# Patient Record
Sex: FEMALE | Race: WHITE | Employment: OTHER | URBAN - METROPOLITAN AREA
[De-identification: names, ages, dates, MRNs, and addresses within clinical notes are randomized per-mention and may not be internally consistent; named-entity substitution may affect disease eponyms.]

---

## 2017-07-18 ENCOUNTER — OFFICE VISIT (OUTPATIENT)
Dept: OBGYN | Facility: CLINIC | Age: 72
End: 2017-07-18
Payer: MEDICARE

## 2017-07-18 ENCOUNTER — RADIANT APPOINTMENT (OUTPATIENT)
Dept: MAMMOGRAPHY | Facility: CLINIC | Age: 72
End: 2017-07-18
Payer: MEDICARE

## 2017-07-18 VITALS — SYSTOLIC BLOOD PRESSURE: 124 MMHG | HEIGHT: 62 IN | DIASTOLIC BLOOD PRESSURE: 86 MMHG

## 2017-07-18 DIAGNOSIS — C54.1 ADENOCARCINOMA OF THE ENDOMETRIUM/UTERUS (H): ICD-10-CM

## 2017-07-18 DIAGNOSIS — Z12.31 VISIT FOR SCREENING MAMMOGRAM: ICD-10-CM

## 2017-07-18 DIAGNOSIS — Z01.419 ENCOUNTER FOR GYNECOLOGICAL EXAMINATION WITHOUT ABNORMAL FINDING: Primary | ICD-10-CM

## 2017-07-18 PROCEDURE — G0145 SCR C/V CYTO,THINLAYER,RESCR: HCPCS | Performed by: OBSTETRICS & GYNECOLOGY

## 2017-07-18 PROCEDURE — G0202 SCR MAMMO BI INCL CAD: HCPCS | Mod: TC

## 2017-07-18 PROCEDURE — 99397 PER PM REEVAL EST PAT 65+ YR: CPT | Performed by: OBSTETRICS & GYNECOLOGY

## 2017-07-18 PROCEDURE — 77063 BREAST TOMOSYNTHESIS BI: CPT | Mod: TC

## 2017-07-18 ASSESSMENT — ANXIETY QUESTIONNAIRES
6. BECOMING EASILY ANNOYED OR IRRITABLE: NOT AT ALL
3. WORRYING TOO MUCH ABOUT DIFFERENT THINGS: NOT AT ALL
5. BEING SO RESTLESS THAT IT IS HARD TO SIT STILL: NOT AT ALL
GAD7 TOTAL SCORE: 0
1. FEELING NERVOUS, ANXIOUS, OR ON EDGE: NOT AT ALL
7. FEELING AFRAID AS IF SOMETHING AWFUL MIGHT HAPPEN: NOT AT ALL
2. NOT BEING ABLE TO STOP OR CONTROL WORRYING: NOT AT ALL
IF YOU CHECKED OFF ANY PROBLEMS ON THIS QUESTIONNAIRE, HOW DIFFICULT HAVE THESE PROBLEMS MADE IT FOR YOU TO DO YOUR WORK, TAKE CARE OF THINGS AT HOME, OR GET ALONG WITH OTHER PEOPLE: NOT DIFFICULT AT ALL

## 2017-07-18 ASSESSMENT — PATIENT HEALTH QUESTIONNAIRE - PHQ9: 5. POOR APPETITE OR OVEREATING: NOT AT ALL

## 2017-07-18 NOTE — MR AVS SNAPSHOT
"              After Visit Summary   2017    Oriana Smith    MRN: [id number]           Patient Information     Date Of Birth          1945        Visit Information        Provider Department      2017 2:00 PM Shan Blood MD [facility]        Today's Diagnoses     Encounter for gynecological examination without abnormal finding    -  1    Adenocarcinoma of the endometrium/uterus (H)           Follow-ups after your visit        Who to contact     If you have questions or need follow up information about today's clinic visit or your schedule please contact [facility] directly at [phone].  Normal or non-critical lab and imaging results will be communicated to you by MyChart, letter or phone within 4 business days after the clinic has received the results. If you do not hear from us within 7 days, please contact the clinic through [employer] or phone. If you have a critical or abnormal lab result, we will notify you by phone as soon as possible.  Submit refill requests through [employer] or call your pharmacy and they will forward the refill request to us. Please allow 3 business days for your refill to be completed.          Additional Information About Your Visit        MyChart Information     [employer] lets you send messages to your doctor, view your test results, renew your prescriptions, schedule appointments and more. To sign up, go to [email or web address] . Click on \"Log in\" on the left side of the screen, which will take you to the Welcome page. Then click on \"Sign up Now\" on the right side of the page.     You will be asked to enter the access code listed below, as well as some personal information. Please follow the directions to create your username and password.     Your access code is: [email or web address]  Expires: 10/16/2017  2:57 PM     Your access code will  in 90 days. If you need help or a new code, please call your [facility] clinic or " "[phone].        Care EveryWhere ID     This is your Care EveryWhere ID. This could be used by other organizations to access your [facility] medical records  [id number]        Your Vitals Were     Height Breastfeeding?                5' 2.25\" (1.581 m) No           Blood Pressure from Last 3 Encounters:   07/18/17 124/86   06/14/16 122/76   08/18/15 110/70    Weight from Last 3 Encounters:   06/14/16 124 lb (56.2 kg)   08/18/15 124 lb (56.2 kg)   10/25/11 117 lb (53.1 kg)              We Performed the Following     Pap imaged thin layer screen reflex to HPV if ASCUS - recommended age 25 - 29 years        Primary Care Provider Office Phone # Fax #    [facility] [phone]       [facility] [address] [address]        Equal Access to Services     DAWNA OZUNA : Hadii aad ku hadasho Soomaali, waaxda luqadaha, qaybta kaalmada adeegyada, waxay idiin hayrickeyn gina gallegosn . So [facility] [phone].    ATENCIÓN: Si habla español, tiene a grande disposición servicios gratuitos de asistencia lingüística. Sylvia al [phone].    We comply with applicable federal civil rights laws and [place] laws. We do not discriminate on the basis of race, color, national origin, age, disability sex, sexual orientation or gender identity.            Thank you!     Thank you for choosing [facility]  for your care. Our goal is always to provide you with excellent care. Hearing back from our patients is one way we can continue to improve our services. Please take a few minutes to complete the written survey that you may receive in the mail after your visit with us. Thank you!             Your Updated Medication List - Protect others around you: Learn how to safely use, store and throw away your medicines at www.disposemymeds.org.          This list is accurate as of: 7/18/17  2:57 PM.  Always use your most recent med list.                   Brand Name Dispense Instructions " for use Diagnosis    buPROPion 100 MG 12 hr tablet    WELLBUTRIN SR          escitalopram 20 MG tablet    LEXAPRO     10 mg        irbesartan 150 MG tablet    AVAPRO          LIPITOR 10 MG tablet   Generic drug:  atorvastatin      Take 5 mg by mouth daily.        methylphenidate ER 36 MG CR tablet    CONCERTA     Take 36-72 mg by mouth every morning.        VITAMIN D (CHOLECALCIFEROL) PO      Take by mouth daily

## 2017-07-18 NOTE — LETTER
August 1, 2017      Oriana Smith  [address] [facility] [address]    Dear ,      I am happy to inform you that your recent cervical cancer screening test (PAP smear) was normal.      Preventative screenings such as this help to ensure your health for years to come. You should repeat a pap smear in 1 year, unless otherwise directed.      You will still need to return to the clinic every year for your annual exam and other preventive tests.     Please contact the clinic at [phone] if you have further questions.       Sincerely,      Shan Blood MD/kelvin

## 2017-07-18 NOTE — PROGRESS NOTES
Oriana is a 72 year old No obstetric history on file. female who presents for annual exam.     Besides routine health maintenance, she has no other health concerns today .    Do you have a Health Care Directive?: No: Advance care planning was reviewed with patient; patient declined at this time.    Fall risk:        HPI: The patient is a 72-year-old who is now 7 years out from diagnosis and treatment of adenocarcinoma the endometrium. She has no current complaints and has no respiratory GI or vaginal bleeding symptoms. She has no  symptoms.  The patient does not have a PCP        GYNECOLOGIC HISTORY:  No LMP recorded. Patient is postmenopausal..   reports that she has quit smoking. Her smoking use included Cigarettes. She has never used smokeless tobacco.    Patient is not sexually active.  STD testing offered?  Declined  Last PHQ-9 score on record=   PHQ-9 SCORE 7/18/2017   Total Score 1     Last GAD7 score on record=   DEENA-7 SCORE 6/14/2016 7/18/2017   Total Score 1 0     Alcohol Score = 5    HEALTH MAINTENANCE:  Cholesterol: (No results found for: CHOL   Last Mammo: one year ago, Result: normal, Next Mammo: today   Pap: (  Lab Results   Component Value Date    PAP NIL 06/14/2016    PAP NIL 08/18/2015    )  DEXA: 6/14/16  Colonoscopy:  10/25/11, Result:  Redundant and long, Next Colonoscopy: NA years.    Health maintenance updated:  yes    HISTORY:  Obstetric History     No data available        Patient Active Problem List   Diagnosis   (none) - all problems resolved or deleted     Past Surgical History:   Procedure Laterality Date     COLONOSCOPY  10/25/2011    Procedure:COLONOSCOPY; COLONOSCOPY; Surgeon:MALLORIE DUFFY; Location: GI     ENT SURGERY      t/a     GYN SURGERY  jan 2010    tahbso nodes     laprotomy      endometriosis      Social History   Substance Use Topics     Smoking status: Former Smoker     Types: Cigarettes     Smokeless tobacco: Never Used     Alcohol use 0.0 oz/week     0 Standard  "drinks or equivalent per week      Comment: two glasses wine/night         Negative family history of: Hypertension, Coronary Artery Disease            Current Outpatient Prescriptions   Medication Sig     VITAMIN D, CHOLECALCIFEROL, PO Take by mouth daily     buPROPion (WELLBUTRIN SR) 100 MG 12 hr tablet      escitalopram (LEXAPRO) 20 MG tablet 10 mg      irbesartan (AVAPRO) 150 MG tablet      atorvastatin (LIPITOR) 10 MG tablet Take 5 mg by mouth daily.     methylphenidate (CONCERTA) 36 MG CR tablet Take 36-72 mg by mouth every morning.     No current facility-administered medications for this visit.        Allergies   Allergen Reactions     Darvon [Propoxyphene Hcl] Rash     Also had muscle contortions         Past medical, surgical, social and family history were reviewed and updated in EPIC.    ROS:   Genitourinary: Incontinence, Urgency and Vaginal Dryness  Neurologic: Dizziness  Psychiatric: Depression    EXAM:  /86  Ht 5' 2.25\" (1.581 m)  Breastfeeding? No   BMI: There is no height or weight on file to calculate BMI.    EXAM:  Constitutional: Appearance: Well nourished, well developed alert, in no acute distress  Neck:  Lymph Nodes:  No lymphadenopathy present    Thyroid:  Gland size normal, nontender, no nodules or masses present  on palpation  Chest:  Respiratory Effort:  Breathing unlabored  Cardiovascular:Heart    Auscultation:  Regular rate, normal rhythm, no murmurs present  Breasts: Inspection of Breasts:  No lymphadenopathy present.  Gastrointestinal:  Abdominal Examination:  Abdomen nontender to palpation, tone normal without     rigidity or guarding, no masses present, umbilicus without lesions    Liver and speen:  No hepatomegaly present, liver nontender to palpation    Hernias:  No hernias present  Lymphatic: Lymph Nodes:  No other lymphadenopathy present  Skin:  General Inspection:  No rashes present, no lesions present, no areas of  discoloration.    Genitalia and Groin:  No rashes " present, no lesions present, no areas of  discoloration, no masses present  Neurologic/Psychiatric:    Mental Status:  Oriented X3     Pelvic Exam:  External Genitalia:     Normal appearance for age, no discharge present, no tenderness present, no inflammatory lesions present, color normal  Vagina:     Normal vaginal vault without central or paravaginal defects, no discharge present, no inflammatory lesions present, no masses present  Bladder:     Nontender to palpation  Urethra:   Urethral Body:  Urethra palpation normal, urethra structural support normal   Urethral Meatus:  No erythema or lesions present  Cervix:     Surgically absent  Uterus:     Surgically absent  Adnexa:     Surgically absent  Perineum:     Perineum within normal limits, no evidence of trauma, no rashes or skin lesions present  Anus:     Anus within normal limits, no hemorrhoids present  Inguinal Lymph Nodes:     No lymphadenopathy present    COUNSELING:   Reviewed preventive health counseling, as reflected in patient instructions       Regular exercise       Healthy diet/nutrition    BMI:  There is no height or weight on file to calculate BMI. Pt declined to weigh today     reports that she has quit smoking. Her smoking use included Cigarettes. She has never used smokeless tobacco.      ASSESSMENT:  72 year old female with satisfactory annual exam.    ICD-10-CM    1. Encounter for gynecological examination without abnormal finding Z01.419        PLAN: Patient with history of adenocarcinoma the endometrium with no evidence of recurrence at this time. We will convey her Pap smear results and mammogram. She will continue on yearly screening with Pap for surveillance of endometrial cancer.      Shan Blood MD

## 2017-07-19 ASSESSMENT — ANXIETY QUESTIONNAIRES: GAD7 TOTAL SCORE: 0

## 2017-07-19 ASSESSMENT — PATIENT HEALTH QUESTIONNAIRE - PHQ9: SUM OF ALL RESPONSES TO PHQ QUESTIONS 1-9: 1

## 2017-07-20 LAB
COPATH REPORT: NORMAL
PAP: NORMAL

## 2019-09-03 NOTE — PROGRESS NOTES
Oriana is a 74 year old No obstetric history on file. female who presents for follow-up of adenocarcinoma of the endometrium.    Do you have a Health Care Directive?: Yes: Patient states has Advance Directive and will bring in a copy to clinic.    Fall risk:   Fallen 2 or more times in the past year?: No  Any fall with injury in the past year?: No    HPI : The patient is seen at this time for follow-up of adenocarcinoma of the endometrium diagnosed and treated surgically in 2010.  The patient has no new symptoms and is on a yearly screening basis for any recurrence of cancer at this time.  She has no other intercurrent health issues at this time.      GYNECOLOGIC HISTORY:  No LMP recorded. Patient is postmenopausal..   reports that she has quit smoking. Her smoking use included cigarettes. She has never used smokeless tobacco.    STD testing offered?  Declined  Last PHQ-9 score on record=   PHQ-9 SCORE 9/4/2019   PHQ-9 Total Score 1     Last GAD7 score on record=   DEENA-7 SCORE 6/14/2016 7/18/2017 9/4/2019   Total Score 1 0 2       HEALTH MAINTENANCE:  Cholesterol: 10/17/2016   Total= 221, Triglycerides=95, HDL=80, TOK=710  Last Mammo: 7/18/2017, Result: normal, Next Mammo: today   Pap: 7/18/2017 NIL  Lab Results   Component Value Date    PAP NIL 07/18/2017    PAP NIL 06/14/2016    PAP NIL 08/18/2015      DEXA:  6/14/2016  Colonoscopy:  10/25/2011, Result:  normal, Next Colonoscopy: 7 years.    HISTORY:  OB History   No data available     Past Medical History:   Diagnosis Date     Arthritis      Depression      High cholesterol      HTN (hypertension)      Malignant neoplasm (H) 2010    endometrial cancer     S/P OMA-BSO (total abdominal hysterectomy and bilateral salpingo-oophorectomy) 1/30/2010    CA of endometrium 1/30/10 OMA-BSO. Cervix removed.  5/13, 11/13, 2/14, 7/14, 2/15 NIL vaginal paps 8/15, 6/16 NIL/Neg HPV vaginal pap 7/17 NIL vaginal pap. Plan: Pt to continue with yearly paps per Dr. Blood.      Past  Surgical History:   Procedure Laterality Date     COLONOSCOPY  10/25/2011    Procedure:COLONOSCOPY; COLONOSCOPY; Surgeon:MALLORIE DUFFY; Location: GI     ENT SURGERY      t/a     GYN SURGERY  jan 2010    tahbso nodes     laprotomy      endometriosis     Family History   Problem Relation Age of Onset     Hypertension No family hx of      Coronary Artery Disease No family hx of      Social History     Socioeconomic History     Marital status:      Spouse name: Not on file     Number of children: Not on file     Years of education: Not on file     Highest education level: Not on file   Occupational History     Not on file   Social Needs     Financial resource strain: Not on file     Food insecurity:     Worry: Not on file     Inability: Not on file     Transportation needs:     Medical: Not on file     Non-medical: Not on file   Tobacco Use     Smoking status: Former Smoker     Types: Cigarettes     Smokeless tobacco: Never Used   Substance and Sexual Activity     Alcohol use: Yes     Alcohol/week: 0.0 oz     Comment: two glasses wine/night     Drug use: No     Sexual activity: Yes     Partners: Male     Birth control/protection: Post-menopausal   Lifestyle     Physical activity:     Days per week: Not on file     Minutes per session: Not on file     Stress: Not on file   Relationships     Social connections:     Talks on phone: Not on file     Gets together: Not on file     Attends Scientologist service: Not on file     Active member of club or organization: Not on file     Attends meetings of clubs or organizations: Not on file     Relationship status: Not on file     Intimate partner violence:     Fear of current or ex partner: Not on file     Emotionally abused: Not on file     Physically abused: Not on file     Forced sexual activity: Not on file   Other Topics Concern     Parent/sibling w/ CABG, MI or angioplasty before 65F 55M? Not Asked   Social History Narrative     Not on file     Current Outpatient  Medications   Medication Sig     atorvastatin (LIPITOR) 10 MG tablet Take 5 mg by mouth daily.     buPROPion (WELLBUTRIN SR) 100 MG 12 hr tablet      escitalopram (LEXAPRO) 20 MG tablet 10 mg      irbesartan (AVAPRO) 150 MG tablet      VITAMIN D, CHOLECALCIFEROL, PO Take by mouth daily     No current facility-administered medications for this visit.      Allergies   Allergen Reactions     Darvon [Propoxyphene Hcl] Rash     Also had muscle contortions         Past medical, surgical, social and family history were reviewed and updated in EPIC.    EXAM:  /70   Pulse 72   Breastfeeding? No    BMI: There is no height or weight on file to calculate BMI.    Constitutional: Appearance: Well nourished, well developed alert, in no acute distress  Breasts: Inspection of Breasts:  No lymphadenopathy present    Palpation of Breasts and Axillae:  No masses present on palpation, no  breast tenderness    Axillary Lymph Nodes:  No lymphadenopathy present  Neurologic/Psychiatric:    Mental Status:  Oriented X3     Pelvic Exam:  External Genitalia:     Normal appearance for age, no discharge present, no tenderness present, no inflammatory lesions present, color normal  Vagina:     Normal vaginal vault without central or paravaginal defects, no discharge present, no inflammatory lesions present, no masses present  Bladder:     Nontender to palpation  Urethra:   Urethral Body:  Urethra palpation normal, urethra structural support normal   Urethral Meatus:  No erythema or lesions present  Cervix:     Surgically absent  Uterus:     Surgically absent  Adnexa:     Surgically absent  Perineum:     Perineum within normal limits, no evidence of trauma, no rashes or skin lesions present  Anus:     Anus within normal limits, no hemorrhoids present  Inguinal Lymph Nodes:     No lymphadenopathy present    There is no height or weight on file to calculate BMI.     reports that she has quit smoking. Her smoking use included cigarettes. She  has never used smokeless tobacco.      ASSESSMENT:  74 year old female with satisfactory examination for follow-up of her cancer.  There is no evidence of recurrence.    ICD-10-CM    1. Encounter for gynecological examination without abnormal finding Z01.419 Pap imaged thin layer screen with HPV - recommended age 30 - 65     HPV High Risk Types DNA Cervical       COUNSELING:   Reviewed preventive health counseling, as reflected in patient instructions       Regular exercise       Healthy diet/nutrition    PLAN/PATIENT INSTRUCTIONS: We will convey the patient's Pap results and see her next year for her interval checkup.  She is asked to call for any abnormal discharge abdominal pain vaginal bleeding.  She still has ongoing urgency incontinence and has been treated with Myrbetriq in the past.        Shan Blood MD

## 2019-09-04 ENCOUNTER — ANCILLARY PROCEDURE (OUTPATIENT)
Dept: MAMMOGRAPHY | Facility: CLINIC | Age: 74
End: 2019-09-04
Payer: MEDICARE

## 2019-09-04 ENCOUNTER — OFFICE VISIT (OUTPATIENT)
Dept: OBGYN | Facility: CLINIC | Age: 74
End: 2019-09-04
Payer: MEDICARE

## 2019-09-04 VITALS — HEART RATE: 72 BPM | SYSTOLIC BLOOD PRESSURE: 110 MMHG | DIASTOLIC BLOOD PRESSURE: 70 MMHG

## 2019-09-04 DIAGNOSIS — C54.1 ADENOCARCINOMA OF THE ENDOMETRIUM/UTERUS (H): Primary | ICD-10-CM

## 2019-09-04 DIAGNOSIS — Z12.31 VISIT FOR SCREENING MAMMOGRAM: ICD-10-CM

## 2019-09-04 PROCEDURE — 77067 SCR MAMMO BI INCL CAD: CPT | Mod: TC

## 2019-09-04 PROCEDURE — G0476 HPV COMBO ASSAY CA SCREEN: HCPCS | Performed by: OBSTETRICS & GYNECOLOGY

## 2019-09-04 PROCEDURE — G0145 SCR C/V CYTO,THINLAYER,RESCR: HCPCS | Performed by: OBSTETRICS & GYNECOLOGY

## 2019-09-04 PROCEDURE — G0123 SCREEN CERV/VAG THIN LAYER: HCPCS | Performed by: OBSTETRICS & GYNECOLOGY

## 2019-09-04 PROCEDURE — 77063 BREAST TOMOSYNTHESIS BI: CPT | Mod: TC

## 2019-09-04 PROCEDURE — 87624 HPV HI-RISK TYP POOLED RSLT: CPT | Performed by: OBSTETRICS & GYNECOLOGY

## 2019-09-04 PROCEDURE — 99212 OFFICE O/P EST SF 10 MIN: CPT | Performed by: OBSTETRICS & GYNECOLOGY

## 2019-09-04 ASSESSMENT — ANXIETY QUESTIONNAIRES
2. NOT BEING ABLE TO STOP OR CONTROL WORRYING: NOT AT ALL
7. FEELING AFRAID AS IF SOMETHING AWFUL MIGHT HAPPEN: NOT AT ALL
GAD7 TOTAL SCORE: 2
IF YOU CHECKED OFF ANY PROBLEMS ON THIS QUESTIONNAIRE, HOW DIFFICULT HAVE THESE PROBLEMS MADE IT FOR YOU TO DO YOUR WORK, TAKE CARE OF THINGS AT HOME, OR GET ALONG WITH OTHER PEOPLE: NOT DIFFICULT AT ALL
3. WORRYING TOO MUCH ABOUT DIFFERENT THINGS: NOT AT ALL
1. FEELING NERVOUS, ANXIOUS, OR ON EDGE: SEVERAL DAYS
5. BEING SO RESTLESS THAT IT IS HARD TO SIT STILL: NOT AT ALL
6. BECOMING EASILY ANNOYED OR IRRITABLE: SEVERAL DAYS

## 2019-09-04 ASSESSMENT — PATIENT HEALTH QUESTIONNAIRE - PHQ9
SUM OF ALL RESPONSES TO PHQ QUESTIONS 1-9: 1
5. POOR APPETITE OR OVEREATING: NOT AT ALL

## 2019-09-04 NOTE — LETTER
September 12, 2019    Oriana CHAKA Luis  [address] [facility] [address]    Dear MsStephania,  This letter is regarding your recent Vaginal Pap smear (cervical cancer screening) and Human Papillomavirus (HPV) test.  We are happy to inform you that your Pap smear result is normal. Cervical cancer is closely linked with certain types of HPV. Your results showed no evidence of high-risk HPV.  We recommend you have your next PAP smear in 1 year.  You will still need to return to the clinic every year for an annual exam and other preventive tests.  If you have additional questions regarding this result, please call our registered nurse, Sallie at [phone].  Sincerely,    Shan Blood MD/kelvin

## 2019-09-04 NOTE — LETTER
September 12, 2019    Oriana CHAKA Luis  [address] [facility] [address]    Dear MsStephania,  This letter is regarding your recent Pap smear (cervical cancer screening) and Human Papillomavirus (HPV) test.  We are happy to inform you that your Pap smear result is normal. Cervical cancer is closely linked with certain types of HPV. Your results showed no evidence of high-risk HPV.  We recommend you have your next PAP smear in 1 year.  You will still need to return to the clinic every year for an annual exam and other preventive tests.  If you have additional questions regarding this result, please call our registered nurse, Sallie at [phone].  Sincerely,    Shan Blood MD/kelvin

## 2019-09-05 ASSESSMENT — ANXIETY QUESTIONNAIRES: GAD7 TOTAL SCORE: 2

## 2019-09-10 LAB
COPATH REPORT: NORMAL
FINAL DIAGNOSIS: NORMAL
HPV HR 12 DNA CVX QL NAA+PROBE: NEGATIVE
HPV16 DNA SPEC QL NAA+PROBE: NEGATIVE
HPV18 DNA SPEC QL NAA+PROBE: NEGATIVE
PAP: NORMAL
SPECIMEN DESCRIPTION: NORMAL
SPECIMEN SOURCE CVX/VAG CYTO: NORMAL

## 2020-10-13 ENCOUNTER — ANCILLARY PROCEDURE (OUTPATIENT)
Dept: MAMMOGRAPHY | Facility: CLINIC | Age: 75
End: 2020-10-13
Payer: MEDICARE

## 2020-10-13 ENCOUNTER — OFFICE VISIT (OUTPATIENT)
Dept: OBGYN | Facility: CLINIC | Age: 75
End: 2020-10-13
Payer: MEDICARE

## 2020-10-13 VITALS
HEART RATE: 84 BPM | HEIGHT: 62 IN | DIASTOLIC BLOOD PRESSURE: 70 MMHG | BODY MASS INDEX: 23.74 KG/M2 | WEIGHT: 129 LBS | SYSTOLIC BLOOD PRESSURE: 110 MMHG

## 2020-10-13 DIAGNOSIS — C54.1 ADENOCARCINOMA OF THE ENDOMETRIUM/UTERUS (H): Primary | ICD-10-CM

## 2020-10-13 DIAGNOSIS — Z12.31 VISIT FOR SCREENING MAMMOGRAM: ICD-10-CM

## 2020-10-13 PROCEDURE — 87624 HPV HI-RISK TYP POOLED RSLT: CPT | Performed by: OBSTETRICS & GYNECOLOGY

## 2020-10-13 PROCEDURE — 99207 PR NO CHARGE LOS: CPT | Performed by: OBSTETRICS & GYNECOLOGY

## 2020-10-13 PROCEDURE — G0145 SCR C/V CYTO,THINLAYER,RESCR: HCPCS | Performed by: OBSTETRICS & GYNECOLOGY

## 2020-10-13 PROCEDURE — 77067 SCR MAMMO BI INCL CAD: CPT | Performed by: RADIOLOGY

## 2020-10-13 PROCEDURE — 77063 BREAST TOMOSYNTHESIS BI: CPT | Performed by: RADIOLOGY

## 2020-10-13 RX ORDER — ASPIRIN 81 MG/1
81 TABLET, CHEWABLE ORAL DAILY
COMMUNITY

## 2020-10-13 RX ORDER — CALCIUM POLYCARBOPHIL 625 MG 625 MG/1
2 TABLET ORAL DAILY
COMMUNITY

## 2020-10-13 ASSESSMENT — PATIENT HEALTH QUESTIONNAIRE - PHQ9
SUM OF ALL RESPONSES TO PHQ QUESTIONS 1-9: 0
5. POOR APPETITE OR OVEREATING: NOT AT ALL

## 2020-10-13 ASSESSMENT — ANXIETY QUESTIONNAIRES
1. FEELING NERVOUS, ANXIOUS, OR ON EDGE: NOT AT ALL
GAD7 TOTAL SCORE: 0
2. NOT BEING ABLE TO STOP OR CONTROL WORRYING: NOT AT ALL
3. WORRYING TOO MUCH ABOUT DIFFERENT THINGS: NOT AT ALL
6. BECOMING EASILY ANNOYED OR IRRITABLE: NOT AT ALL
7. FEELING AFRAID AS IF SOMETHING AWFUL MIGHT HAPPEN: NOT AT ALL
IF YOU CHECKED OFF ANY PROBLEMS ON THIS QUESTIONNAIRE, HOW DIFFICULT HAVE THESE PROBLEMS MADE IT FOR YOU TO DO YOUR WORK, TAKE CARE OF THINGS AT HOME, OR GET ALONG WITH OTHER PEOPLE: NOT DIFFICULT AT ALL
5. BEING SO RESTLESS THAT IT IS HARD TO SIT STILL: NOT AT ALL

## 2020-10-13 ASSESSMENT — MIFFLIN-ST. JEOR: SCORE: 1030.45

## 2020-10-13 NOTE — LETTER
October 21, 2020      Oriana Smith  [address] [facility] [address]        Dear ,    We are happy to inform you that your recent vaginal Pap smear and Human Papillomavirus (HPV) test results are normal and negative.    It is recommended that you have your next Pap smear and Human Papillomavirus (HPV) test in 1 year. You will also need to return to the clinic every year for an annual wellness visit.    If you have additional questions regarding this result, please contact our office and we will be happy to assist you.      Sincerely,    Your [facility] Care Team

## 2020-10-13 NOTE — PROGRESS NOTES
Oriana is a 75 year old No obstetric history on file. female who presents for follow-up of adenocarcinoma of the endometrium FIGO stage I-2010    Do you have a Health Care Directive?: Yes, patient states has an Advance Care Planning document and will bring a copy to the clinic.    Fall risk:   Fallen 2 or more times in the past year?: Yes  Any fall with injury in the past year?: No    HPI : The patient is seen at this time for follow-up of adenocarcinoma of the endometrium.  She was treated surgically in 2010 and has been seen on a regular basis.  She shows no sign of recurrence.  She has had no sign of bleeding change in bowel or bladder function.  Of note she has had multiple Botox treatments to her bladder this year.  She was diagnosed with low pressure hydrocephalus and had a shunt placed this year.      GYNECOLOGIC HISTORY:  No LMP recorded. Patient is postmenopausal..   reports that she has quit smoking. Her smoking use included cigarettes. She has never used smokeless tobacco.    STD testing offered?  Declined  Last PHQ-9 score on record=   PHQ-9 SCORE 10/13/2020   PHQ-9 Total Score 0     Last GAD7 score on record=   DEENA-7 SCORE 7/18/2017 9/4/2019 10/13/2020   Total Score 0 2 0       HEALTH MAINTENANCE:  Cholesterol:08/07/2019   Total= 215, Triglycerides=115, HDL=70, NFY=419, FBS=98  Last Mammo: One year ago, Result: Normal, Next Mammo: Today   Pap:   Lab Results   Component Value Date    PAP NIL, HPV- 09/04/2019    PAP NIL 07/18/2017    PAP NIL 06/14/2016      DEXA: 06/14/2016   FINDINGS:               Lumbar Spine (L1-L4) T-score:  -0.8               Left Femoral Neck T-score:  -1.8               Right Femoral Neck T-score:  -2.3               Forearm (radius 33%) T-score:       Lumbar (L1-L4) BMD: 1.094   Previous: 1.114             Total Hip Mean BMD: 0.752    Previous: 0.826    Colonoscopy:  10/25/2011, Result:  Normal, Next Colonoscopy: Overdue.    HISTORY:  OB History   No obstetric history on file.      Past Medical History:   Diagnosis Date     Arthritis      Depression      High cholesterol      HTN (hypertension)      Malignant neoplasm (H) 2010    endometrial cancer     S/P OMA-BSO (total abdominal hysterectomy and bilateral salpingo-oophorectomy) 1/30/2010    CA of endometrium 1/30/10 OMA-BSO. Cervix removed.  5/13, 11/13, 2/14, 7/14, 2/15 NIL vaginal paps 8/15, 6/16 NIL/Neg HPV vaginal pap 7/17 NIL vaginal pap. Plan: Pt to continue with yearly paps per Dr. Blood.      Past Surgical History:   Procedure Laterality Date     COLONOSCOPY  10/25/2011    Procedure:COLONOSCOPY; COLONOSCOPY; Surgeon:MALLORIE DUFFY; Location: GI     ENT SURGERY      t/a     GYN SURGERY  01/01/2010    tahbso nodes     laprotomy      endometriosis     NEUROSURGERY ADULT IP CONSULT      Benign pressure hydrorcephelis     Family History   Problem Relation Age of Onset     Hypertension No family hx of      Coronary Artery Disease No family hx of      Social History     Socioeconomic History     Marital status:      Spouse name: Not on file     Number of children: Not on file     Years of education: Not on file     Highest education level: Not on file   Occupational History     Not on file   Social Needs     Financial resource strain: Not on file     Food insecurity     Worry: Not on file     Inability: Not on file     Transportation needs     Medical: Not on file     Non-medical: Not on file   Tobacco Use     Smoking status: Former Smoker     Types: Cigarettes     Smokeless tobacco: Never Used   Substance and Sexual Activity     Alcohol use: Yes     Alcohol/week: 0.0 standard drinks     Comment: two glasses wine/night     Drug use: No     Sexual activity: Yes     Partners: Male     Birth control/protection: Post-menopausal   Lifestyle     Physical activity     Days per week: Not on file     Minutes per session: Not on file     Stress: Not on file   Relationships     Social connections     Talks on phone: Not on file     Gets  "together: Not on file     Attends Mu-ism service: Not on file     Active member of club or organization: Not on file     Attends meetings of clubs or organizations: Not on file     Relationship status: Not on file     Intimate partner violence     Fear of current or ex partner: Not on file     Emotionally abused: Not on file     Physically abused: Not on file     Forced sexual activity: Not on file   Other Topics Concern     Parent/sibling w/ CABG, MI or angioplasty before 65F 55M? Not Asked   Social History Narrative     Not on file     Current Outpatient Medications   Medication Sig     aspirin (ASA) 81 MG chewable tablet Take 81 mg by mouth daily     atorvastatin (LIPITOR) 10 MG tablet Take 5 mg by mouth daily.     buPROPion (WELLBUTRIN SR) 100 MG 12 hr tablet      calcium polycarbophil (FIBERCON) 625 MG tablet Take 2 tablets by mouth daily     escitalopram (LEXAPRO) 20 MG tablet 10 mg      irbesartan (AVAPRO) 150 MG tablet      VITAMIN D, CHOLECALCIFEROL, PO Take by mouth daily     No current facility-administered medications for this visit.      Allergies   Allergen Reactions     Perfume Anaphylaxis     Floral scents from perfume, flowers     Darvon [Propoxyphene Hcl] Rash     Also had muscle contortions         Past medical, surgical, social and family history were reviewed and updated in EPIC.    EXAM:  /70   Pulse 84   Ht 1.562 m (5' 1.5\")   Wt 58.5 kg (129 lb)   Breastfeeding No   BMI 23.98 kg/m     BMI: Body mass index is 23.98 kg/m .    Constitutional: Appearance: Well nourished, well developed alert, in no acute distress  Breasts: Inspection of Breasts:  No lymphadenopathy present    Palpation of Breasts and Axillae:  No masses present on palpation, no  breast tenderness    Axillary Lymph Nodes:  No lymphadenopathy present  Neurologic/Psychiatric:    Mental Status:  Oriented X3     Pelvic Exam:  External Genitalia:     Normal appearance for age, no discharge present, no tenderness present, " no inflammatory lesions present, color normal  Vagina:     Normal vaginal vault without central or paravaginal defects, no discharge present, no inflammatory lesions present, no masses present  Bladder:     Nontender to palpation  Urethra:   Urethral Body:  Urethra palpation normal, urethra structural support normal   Urethral Meatus:  No erythema or lesions present  Cervix:     Surgically absent  Uterus:     Surgically absent  Adnexa:     Surgically absent  Perineum:     Perineum within normal limits, no evidence of trauma, no rashes or skin lesions present  Anus:     Anus within normal limits, no hemorrhoids present  Inguinal Lymph Nodes:     No lymphadenopathy present    Body mass index is 23.98 kg/m .     reports that she has quit smoking. Her smoking use included cigarettes. She has never used smokeless tobacco.      ASSESSMENT:  75 year old female with satisfactory follow-up of adenocarcinoma with no evidence of recurrence at this time..    ICD-10-CM    1. Encounter for gynecological examination without abnormal finding  Z01.419 Pap imaged thin layer screen with HPV - recommended age 30 - 65     HPV High Risk Types DNA Cervical       COUNSELING:   Reviewed preventive health counseling, as reflected in patient instructions       Regular exercise       Healthy diet/nutrition    PLAN/PATIENT INSTRUCTIONS: We will convey her Pap results and see her back next year.        Shan Blood MD

## 2020-10-14 ASSESSMENT — ANXIETY QUESTIONNAIRES: GAD7 TOTAL SCORE: 0

## 2020-10-15 LAB
COPATH REPORT: NORMAL
PAP: NORMAL

## 2020-10-19 LAB
FINAL DIAGNOSIS: NORMAL
HPV HR 12 DNA CVX QL NAA+PROBE: NEGATIVE
HPV16 DNA SPEC QL NAA+PROBE: NEGATIVE
HPV18 DNA SPEC QL NAA+PROBE: NEGATIVE
SPECIMEN DESCRIPTION: NORMAL
SPECIMEN SOURCE CVX/VAG CYTO: NORMAL

## 2021-10-26 ENCOUNTER — OFFICE VISIT (OUTPATIENT)
Dept: OBGYN | Facility: CLINIC | Age: 76
End: 2021-10-26
Payer: MEDICARE

## 2021-10-26 VITALS
HEIGHT: 63 IN | DIASTOLIC BLOOD PRESSURE: 62 MMHG | HEART RATE: 80 BPM | BODY MASS INDEX: 23.22 KG/M2 | SYSTOLIC BLOOD PRESSURE: 122 MMHG

## 2021-10-26 DIAGNOSIS — Z01.419 ENCOUNTER FOR GYNECOLOGICAL EXAMINATION WITHOUT ABNORMAL FINDING: Primary | ICD-10-CM

## 2021-10-26 DIAGNOSIS — C54.1 ADENOCARCINOMA OF THE ENDOMETRIUM/UTERUS (H): ICD-10-CM

## 2021-10-26 PROCEDURE — G0101 CA SCREEN;PELVIC/BREAST EXAM: HCPCS | Performed by: NURSE PRACTITIONER

## 2021-10-26 PROCEDURE — G0145 SCR C/V CYTO,THINLAYER,RESCR: HCPCS | Performed by: NURSE PRACTITIONER

## 2021-10-26 PROCEDURE — 87624 HPV HI-RISK TYP POOLED RSLT: CPT | Performed by: NURSE PRACTITIONER

## 2021-10-26 RX ORDER — BIOTIN 10000 MCG
CAPSULE ORAL
COMMUNITY

## 2021-10-26 ASSESSMENT — PATIENT HEALTH QUESTIONNAIRE - PHQ9
SUM OF ALL RESPONSES TO PHQ QUESTIONS 1-9: 0
5. POOR APPETITE OR OVEREATING: NOT AT ALL

## 2021-10-26 ASSESSMENT — ANXIETY QUESTIONNAIRES
5. BEING SO RESTLESS THAT IT IS HARD TO SIT STILL: NOT AT ALL
7. FEELING AFRAID AS IF SOMETHING AWFUL MIGHT HAPPEN: NOT AT ALL
6. BECOMING EASILY ANNOYED OR IRRITABLE: NOT AT ALL
1. FEELING NERVOUS, ANXIOUS, OR ON EDGE: NOT AT ALL
GAD7 TOTAL SCORE: 0
IF YOU CHECKED OFF ANY PROBLEMS ON THIS QUESTIONNAIRE, HOW DIFFICULT HAVE THESE PROBLEMS MADE IT FOR YOU TO DO YOUR WORK, TAKE CARE OF THINGS AT HOME, OR GET ALONG WITH OTHER PEOPLE: NOT DIFFICULT AT ALL
2. NOT BEING ABLE TO STOP OR CONTROL WORRYING: NOT AT ALL
3. WORRYING TOO MUCH ABOUT DIFFERENT THINGS: NOT AT ALL

## 2021-10-26 NOTE — PROGRESS NOTES
Oriana is a 76 year old No obstetric history on file. female who presents for Medicare Limited exam.     Do you have a Health Care Directive?: No, advance care planning information given to patient to review.  Patient plans to discuss their wishes with loved ones or provider.      Fall risk:        HPI :  Patient here today for her limited GYN exam.  She has a history of adenocarcinoma of the endometrium and has had a OMA/BSO.  She is not on any hormone replacement therapy at this time.  She denies any vaginal bleeding.    She tripped in a hotel on a rug and fractured her right femur and is recovering from this.    Since August she has been wearing depends for urinary incontinence due to the limited mobility of her femur fracture.    GYNECOLOGIC HISTORY:  No LMP recorded. Patient is postmenopausal..   reports that she has quit smoking. Her smoking use included cigarettes. She has never used smokeless tobacco.    STD testing offered?  Declined  Last PHQ-9 score on record=   PHQ-9 SCORE 10/26/2021   PHQ-9 Total Score 0     Last GAD7 score on record=   DEENA-7 SCORE 9/4/2019 10/13/2020 10/26/2021   Total Score 2 0 0       HEALTH MAINTENANCE:  Cholesterol: (No results found for: CHOL   Last Mammo: One year ago, Result: Normal, Next Mammo: Scheduled for 11/08/2021   Pap:   Lab Results   Component Value Date    PAP NIL, HPV- 10/13/2020    PAP NIL 09/04/2019    PAP NIL 07/18/2017      DEXA:  08/20/2019  Colonoscopy:  10/25/2011, Result:  Normal, Next Colonoscopy: Pt will get another one in the spring.    HISTORY:  OB History   No obstetric history on file.     Past Medical History:   Diagnosis Date     Arthritis      Broken femur (H)      Depression      High cholesterol      HTN (hypertension)      Malignant neoplasm (H) 2010    endometrial cancer     S/P OMA-BSO (total abdominal hysterectomy and bilateral salpingo-oophorectomy) 01/30/2010    CA of endometrium 1/30/10 OMA-BSO. Cervix removed.  5/13, 11/13, 2/14, 7/14, 2/15  NIL vaginal paps 8/15, 6/16 NIL/Neg HPV vaginal pap 7/17 NIL vaginal pap. Plan: Pt to continue with yearly paps per Dr. Blood.      Past Surgical History:   Procedure Laterality Date     COLONOSCOPY  10/25/2011    Procedure:COLONOSCOPY; COLONOSCOPY; Surgeon:MALLORIE DUFFY; Location: GI     ENT SURGERY      t/a     GYN SURGERY  01/01/2010    tahbso nodes     laprotomy      endometriosis     NEUROSURGERY ADULT IP CONSULT      Benign pressure hydrorcephelis     ORIF FEMORAL SHAFT FRACTURE W/ PLATES AND SCREWS       Family History   Problem Relation Age of Onset     Hypertension No family hx of      Coronary Artery Disease No family hx of      Social History     Socioeconomic History     Marital status:      Spouse name: Not on file     Number of children: Not on file     Years of education: Not on file     Highest education level: Not on file   Occupational History     Not on file   Tobacco Use     Smoking status: Former Smoker     Types: Cigarettes     Smokeless tobacco: Never Used   Substance and Sexual Activity     Alcohol use: Yes     Alcohol/week: 0.0 standard drinks     Comment: two glasses wine/night     Drug use: No     Sexual activity: Yes     Partners: Male     Birth control/protection: Post-menopausal   Other Topics Concern     Parent/sibling w/ CABG, MI or angioplasty before 65F 55M? Not Asked   Social History Narrative     Not on file     Social Determinants of Health     Financial Resource Strain:      Difficulty of Paying Living Expenses:    Food Insecurity:      Worried About Running Out of Food in the Last Year:      Ran Out of Food in the Last Year:    Transportation Needs:      Lack of Transportation (Medical):      Lack of Transportation (Non-Medical):    Physical Activity:      Days of Exercise per Week:      Minutes of Exercise per Session:    Stress:      Feeling of Stress :    Social Connections:      Frequency of Communication with Friends and Family:      Frequency of Social  "Gatherings with Friends and Family:      Attends Restorationism Services:      Active Member of Clubs or Organizations:      Attends Club or Organization Meetings:      Marital Status:    Intimate Partner Violence:      Fear of Current or Ex-Partner:      Emotionally Abused:      Physically Abused:      Sexually Abused:      Current Outpatient Medications   Medication Sig     aspirin (ASA) 81 MG chewable tablet Take 81 mg by mouth daily     atorvastatin (LIPITOR) 10 MG tablet Take 5 mg by mouth daily.     Biotin 10 MG CAPS      buPROPion (WELLBUTRIN SR) 100 MG 12 hr tablet      calcium polycarbophil (FIBERCON) 625 MG tablet Take 2 tablets by mouth daily     escitalopram (LEXAPRO) 20 MG tablet 10 mg      irbesartan (AVAPRO) 150 MG tablet      VITAMIN D, CHOLECALCIFEROL, PO Take by mouth daily     No current facility-administered medications for this visit.     Allergies   Allergen Reactions     Perfume Anaphylaxis     Floral scents from perfume, flowers     Lisinopril Cough     PN:  Cough  PN:  Cough    PN:  Cough       Darvon [Propoxyphene Hcl] Rash     Also had muscle contortions         Past medical, surgical, social and family history were reviewed and updated in EPIC.    EXAM:  /62   Pulse 80   Ht 1.588 m (5' 2.5\")   Breastfeeding No   BMI 23.22 kg/m     BMI: Body mass index is 23.22 kg/m .    Constitutional: Appearance: Well nourished, well developed alert, in no acute distress  Breasts: Inspection of Breasts:  No lymphadenopathy present    Palpation of Breasts and Axillae:  No masses present on palpation, no  breast tenderness    Axillary Lymph Nodes:  No lymphadenopathy present  Neurologic/Psychiatric:    Mental Status:  Oriented X3     Pelvic Exam:  External Genitalia:     Normal appearance for age, no discharge present, no tenderness present, no inflammatory lesions present, color normal  Vagina:     Normal vaginal vault without central or paravaginal defects, no discharge present, no inflammatory " lesions present, no masses present  Bladder:     Nontender to palpation  Urethra:   Urethral Body:  Urethra palpation normal, urethra structural support normal   Urethral Meatus:  No erythema or lesions present  Cervix:     Surgically absent  Uterus:     Surgically absent  Adnexa:     Surgically absent  Perineum:     Perineum within normal limits, no evidence of trauma, no rashes or skin lesions present  Anus:     Anus within normal limits, no hemorrhoids present  Inguinal Lymph Nodes:     No lymphadenopathy present    Body mass index is 23.22 kg/m .     reports that she has quit smoking. Her smoking use included cigarettes. She has never used smokeless tobacco.      ASSESSMENT:  76 year old female with satisfactory annual exam.    ICD-10-CM    1. Encounter for gynecological examination without abnormal finding  Z01.419 Pap thin layer screen with HPV - recommended age 30 - 65 years     Medicare Limited Visit   2. Adenocarcinoma of the endometrium/uterus (H)  C54.1 Pap thin layer screen with HPV - recommended age 30 - 65 years       COUNSELING:   Special attention given to:       Regular exercise       Healthy diet/nutrition    PLAN/PATIENT INSTRUCTIONS:  76-year-old female with past history of adenocarcinoma of the endometrium with no signs of recurrence at this time.  She is to continue with annual vaginal Paps.  We have encouraged her to get her mammogram done prior to leaving to [place] for the winter.  MYRON Vang CNP

## 2021-10-27 ASSESSMENT — ANXIETY QUESTIONNAIRES: GAD7 TOTAL SCORE: 0

## 2021-10-28 LAB
BKR LAB AP GYN ADEQUACY: NORMAL
BKR LAB AP GYN INTERPRETATION: NORMAL
BKR LAB AP HPV REFLEX: NORMAL
BKR LAB AP PREVIOUS ABNORMAL: NORMAL
PATH REPORT.COMMENTS IMP SPEC: NORMAL
PATH REPORT.RELEVANT HX SPEC: NORMAL

## 2021-11-01 LAB
HUMAN PAPILLOMA VIRUS 16 DNA: NEGATIVE
HUMAN PAPILLOMA VIRUS 18 DNA: NEGATIVE
HUMAN PAPILLOMA VIRUS FINAL DIAGNOSIS: NORMAL
HUMAN PAPILLOMA VIRUS OTHER HR: NEGATIVE